# Patient Record
Sex: MALE | Race: WHITE | URBAN - METROPOLITAN AREA
[De-identification: names, ages, dates, MRNs, and addresses within clinical notes are randomized per-mention and may not be internally consistent; named-entity substitution may affect disease eponyms.]

---

## 2017-07-31 ENCOUNTER — OFFICE VISIT (OUTPATIENT)
Dept: FAMILY MEDICINE CLINIC | Age: 37
End: 2017-07-31

## 2017-07-31 VITALS
SYSTOLIC BLOOD PRESSURE: 116 MMHG | HEART RATE: 61 BPM | DIASTOLIC BLOOD PRESSURE: 70 MMHG | BODY MASS INDEX: 23.94 KG/M2 | WEIGHT: 171 LBS | HEIGHT: 71 IN

## 2017-07-31 DIAGNOSIS — M79.642 PAIN OF LEFT HAND: Primary | ICD-10-CM

## 2017-07-31 DIAGNOSIS — F32.5 DEPRESSION, MAJOR, IN REMISSION (HCC): ICD-10-CM

## 2017-07-31 PROCEDURE — 99213 OFFICE O/P EST LOW 20 MIN: CPT | Performed by: INTERNAL MEDICINE

## 2017-07-31 RX ORDER — FLUOXETINE HYDROCHLORIDE 20 MG/1
20 CAPSULE ORAL DAILY
Qty: 90 CAPSULE | Refills: 1 | Status: SHIPPED | OUTPATIENT
Start: 2017-07-31 | End: 2018-03-19 | Stop reason: SDUPTHER

## 2017-07-31 RX ORDER — PREDNISONE 20 MG/1
TABLET ORAL
Qty: 15 TABLET | Refills: 0 | Status: SHIPPED | OUTPATIENT
Start: 2017-07-31 | End: 2017-08-10

## 2017-07-31 ASSESSMENT — PATIENT HEALTH QUESTIONNAIRE - PHQ9
SUM OF ALL RESPONSES TO PHQ9 QUESTIONS 1 & 2: 0
SUM OF ALL RESPONSES TO PHQ QUESTIONS 1-9: 0
2. FEELING DOWN, DEPRESSED OR HOPELESS: 0
1. LITTLE INTEREST OR PLEASURE IN DOING THINGS: 0

## 2017-07-31 ASSESSMENT — ENCOUNTER SYMPTOMS
ABDOMINAL PAIN: 0
DIARRHEA: 0
RESPIRATORY NEGATIVE: 1
CONSTIPATION: 0
BLOOD IN STOOL: 0
NAUSEA: 0
SORE THROAT: 0

## 2018-03-19 ENCOUNTER — OFFICE VISIT (OUTPATIENT)
Dept: FAMILY MEDICINE CLINIC | Age: 38
End: 2018-03-19

## 2018-03-19 VITALS
HEIGHT: 71 IN | SYSTOLIC BLOOD PRESSURE: 122 MMHG | BODY MASS INDEX: 24.5 KG/M2 | DIASTOLIC BLOOD PRESSURE: 74 MMHG | WEIGHT: 175 LBS | HEART RATE: 60 BPM

## 2018-03-19 DIAGNOSIS — F32.5 DEPRESSION, MAJOR, IN REMISSION (HCC): ICD-10-CM

## 2018-03-19 PROCEDURE — 99213 OFFICE O/P EST LOW 20 MIN: CPT | Performed by: INTERNAL MEDICINE

## 2018-03-19 RX ORDER — FLUOXETINE HYDROCHLORIDE 20 MG/1
20 CAPSULE ORAL DAILY
Qty: 90 CAPSULE | Refills: 1 | Status: SHIPPED | OUTPATIENT
Start: 2018-03-19 | End: 2018-08-07 | Stop reason: SDUPTHER

## 2018-03-19 ASSESSMENT — ENCOUNTER SYMPTOMS
DIARRHEA: 0
RESPIRATORY NEGATIVE: 1
CONSTIPATION: 0
SORE THROAT: 0
NAUSEA: 0
ABDOMINAL PAIN: 0
BLOOD IN STOOL: 0

## 2018-03-19 NOTE — PATIENT INSTRUCTIONS
SURVEY:    You may be receiving a survey from "[employer] regarding your visit today. Please complete the survey to enable us to provide the highest quality of care to you and your family. If you cannot score us a very good on any question, please call the office to discuss how we could of made your experience a very good one. Thank you. 1. Depression, major, in remission ([facility] [address]  Continue the current medication. Rx of Prozac sent in. Mat Alvarado was instructed to follow up in the clinic in 6 months for check up or as needed with any medical issues.          - FLUoxetine (PROZAC) 20 MG capsule; Take 1 capsule by mouth daily  Dispense: 90 capsule;  Refill: 1

## 2018-03-19 NOTE — PROGRESS NOTES
Chronic Disease Visit Information    BP Readings from Last 3 Encounters:   07/31/17 116/70   11/17/16 124/76   10/12/16 124/78          BUN (mg/dL)   Date Value   04/05/2016 16     CREATININE (mg/dL)   Date Value   04/05/2016 0.72     Glucose (mg/dL)   Date Value   04/05/2016 89   05/05/2012 99            Have you changed or started any medications since your last visit including any over-the-counter medicines, vitamins, or herbal medicines? no   Are you having any side effects from any of your medications? -  no  Have you stopped taking any of your medications? Is so, why? -  no    Have you seen any other physician or provider since your last visit? No  Have you had any other diagnostic tests since your last visit? No  Have you been seen in the emergency room and/or had an admission to a hospital since we last saw you? No  Have you had your annual diabetic retinal (eye) exam? No  Have you had your routine dental cleaning in the past 6 months? no    Have you activated your [employer] account? If not, what are your barriers?  No:      Patient Care Team:  Samara Phillips MD as PCP - General (Internal Medicine)         Medical History Review  Past Medical, Family, and Social History reviewed and does contribute to the patient presenting condition    Health Maintenance   Topic Date Due    HIV screen  10/06/1995    Flu vaccine (1) 09/01/2017    DTaP/Tdap/Td vaccine (2 - Tdap) 08/24/2023

## 2018-08-07 DIAGNOSIS — F32.5 DEPRESSION, MAJOR, IN REMISSION (HCC): ICD-10-CM

## 2018-08-07 RX ORDER — FLUOXETINE HYDROCHLORIDE 20 MG/1
20 CAPSULE ORAL DAILY
Qty: 90 CAPSULE | Refills: 1 | Status: SHIPPED | OUTPATIENT
Start: 2018-08-07 | End: 2019-03-11 | Stop reason: SDUPTHER

## 2018-09-10 ENCOUNTER — OFFICE VISIT (OUTPATIENT)
Dept: FAMILY MEDICINE CLINIC | Age: 38
End: 2018-09-10

## 2018-09-10 VITALS
DIASTOLIC BLOOD PRESSURE: 73 MMHG | WEIGHT: 176 LBS | HEIGHT: 71 IN | SYSTOLIC BLOOD PRESSURE: 123 MMHG | HEART RATE: 69 BPM | BODY MASS INDEX: 24.64 KG/M2

## 2018-09-10 DIAGNOSIS — F32.5 DEPRESSION, MAJOR, IN REMISSION (HCC): Primary | ICD-10-CM

## 2018-09-10 DIAGNOSIS — R07.9 CHEST PAIN, UNSPECIFIED TYPE: ICD-10-CM

## 2018-09-10 PROCEDURE — 99213 OFFICE O/P EST LOW 20 MIN: CPT | Performed by: INTERNAL MEDICINE

## 2018-09-10 RX ORDER — ASPIRIN 81 MG/1
81 TABLET ORAL DAILY
Qty: 30 TABLET | Refills: 3
Start: 2018-09-10 | End: 2019-03-11

## 2018-09-10 ASSESSMENT — ENCOUNTER SYMPTOMS
NAUSEA: 0
DIARRHEA: 0
ABDOMINAL PAIN: 0
CONSTIPATION: 0
RESPIRATORY NEGATIVE: 1
SORE THROAT: 0
BLOOD IN STOOL: 0

## 2018-09-10 ASSESSMENT — PATIENT HEALTH QUESTIONNAIRE - PHQ9
SUM OF ALL RESPONSES TO PHQ QUESTIONS 1-9: 2
1. LITTLE INTEREST OR PLEASURE IN DOING THINGS: 1
SUM OF ALL RESPONSES TO PHQ9 QUESTIONS 1 & 2: 2
SUM OF ALL RESPONSES TO PHQ QUESTIONS 1-9: 2
2. FEELING DOWN, DEPRESSED OR HOPELESS: 1

## 2018-11-15 LAB
CHOLESTEROL, TOTAL: NORMAL MG/DL
CHOLESTEROL/HDL RATIO: 4.8
HDLC SERPL-MCNC: 37 MG/DL (ref 35–70)
LDL CHOLESTEROL CALCULATED: 133 MG/DL (ref 0–160)
TRIGL SERPL-MCNC: 42 MG/DL
VLDLC SERPL CALC-MCNC: NORMAL MG/DL

## 2019-03-11 ENCOUNTER — OFFICE VISIT (OUTPATIENT)
Dept: FAMILY MEDICINE CLINIC | Age: 39
End: 2019-03-11

## 2019-03-11 VITALS
SYSTOLIC BLOOD PRESSURE: 114 MMHG | HEART RATE: 62 BPM | DIASTOLIC BLOOD PRESSURE: 66 MMHG | WEIGHT: 175 LBS | HEIGHT: 71 IN | BODY MASS INDEX: 24.5 KG/M2

## 2019-03-11 DIAGNOSIS — F32.5 DEPRESSION, MAJOR, IN REMISSION (HCC): ICD-10-CM

## 2019-03-11 PROCEDURE — 99213 OFFICE O/P EST LOW 20 MIN: CPT | Performed by: INTERNAL MEDICINE

## 2019-03-11 RX ORDER — FLUOXETINE HYDROCHLORIDE 20 MG/1
20 CAPSULE ORAL DAILY
Qty: 90 CAPSULE | Refills: 3 | Status: SHIPPED | OUTPATIENT
Start: 2019-03-11 | End: 2020-08-07 | Stop reason: SDUPTHER

## 2019-03-11 ASSESSMENT — ENCOUNTER SYMPTOMS
NAUSEA: 0
SORE THROAT: 0
RESPIRATORY NEGATIVE: 1
CONSTIPATION: 0
ABDOMINAL PAIN: 0
BLOOD IN STOOL: 0
DIARRHEA: 0

## 2019-06-17 ENCOUNTER — OFFICE VISIT (OUTPATIENT)
Dept: PRIMARY CARE CLINIC | Age: 39
End: 2019-06-17

## 2019-06-17 VITALS
DIASTOLIC BLOOD PRESSURE: 74 MMHG | TEMPERATURE: 97.7 F | OXYGEN SATURATION: 98 % | SYSTOLIC BLOOD PRESSURE: 121 MMHG | BODY MASS INDEX: 24.41 KG/M2 | HEART RATE: 52 BPM | WEIGHT: 175 LBS

## 2019-06-17 DIAGNOSIS — B88.0 BITES, CHIGGER: Primary | ICD-10-CM

## 2019-06-17 PROCEDURE — 99213 OFFICE O/P EST LOW 20 MIN: CPT | Performed by: NURSE PRACTITIONER

## 2019-06-17 RX ORDER — PERMETHRIN 50 MG/G
CREAM TOPICAL
Qty: 60 G | Refills: 0 | Status: SHIPPED | OUTPATIENT
Start: 2019-06-17 | End: 2020-03-12

## 2019-06-17 ASSESSMENT — ENCOUNTER SYMPTOMS
VOMITING: 0
COUGH: 0
SHORTNESS OF BREATH: 0
RHINORRHEA: 0
SORE THROAT: 0

## 2019-06-17 NOTE — PROGRESS NOTES
[address] [facility] WALK-IN [facility] [phone] [id number]  Dept: [phone]  Dept Fax: [phone]    Carolina Graf is a 45 y.o. male who presents to the [address] [place] [address] in Care today for his medical conditions/complaints as noted below. Carmelo Hinds c/o of Rash (patient presents today for rash on LORRI arms and legs for 2 weeks, patient also states the rash in on his back as well. Patient states he has tried otc anitfungal powder without much relief  of itch, )      HPI:   Rash   This is a new problem. The current episode started 1 to 4 weeks ago. The problem is unchanged (Teen son has same appearing rash and symptoms who also works with patient). The affected locations include the left arm, right arm, back, right lower leg, right upper leg, genitalia and groin. The rash is characterized by itchiness and dryness. 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 Cattle and house animals are reported as healthy without hair loss. No fleas reported. Pertinent negatives include no congestion, cough, facial edema, fever, joint pain, rhinorrhea, shortness of breath, sore throat or vomiting. Treatments tried: Anti fungal powders. The treatment provided no relief. (Lyme disease)       Past Medical History:   Diagnosis Date    Depression     Lyme disease [id number]        Current Outpatient Medications   Medication Sig Dispense Refill    Cetirizine HCl (ZYRTEC ALLERGY) 10 MG CAPS Take 10 mg by mouth daily for 14 days 14 capsule 0    permethrin (ELIMITE) 5 % cream Apply topically massaging into the skin over the entire body, from the head to the soles of the feet. remove after 8 hours by bathing or showering 60 g 0    FLUoxetine (PROZAC) 20 MG capsule Take 1 capsule by mouth daily 90 capsule 3     No current facility-administered medications for this visit. No Known Allergies    Subjective:      Review of Systems   Constitutional: Negative for fever. HENT: Negative for congestion, rhinorrhea and sore throat. Respiratory: Negative for cough and shortness of breath. Gastrointestinal: Negative for vomiting. Musculoskeletal: Negative for joint pain. Skin: Positive for rash. Objective:     Physical Exam   Constitutional:   Appears to be of stated age with warm, dry skin; normal coloration; rash as documented. Patient is well-appearing, well-hydrated, nontoxic, comfortable, alert and oriented, pleasant and talkative without apparent distress. Neck: Normal range of motion. Cardiovascular: Normal rate and regular rhythm. Pulmonary/Chest: Effort normal and breath sounds normal.   Skin: Rash noted. Pruritic papular/scabbed rash as noted in diagram above. No cellulitis. No drainage. Examination of the hands and finger webs appear within normal limits without said rash. Exam chaperoned by Crystal Cloud MA. Nursing note and vitals reviewed. /74   Pulse 52   Temp 97.7 °F (36.5 °C)   Wt 175 lb (79.4 kg)   SpO2 98%   BMI 24.41 kg/m²   No results found for this visit on 06/17/19. Assessment:      Diagnosis Orders   1. Bites, chigger  Cetirizine HCl (ZYRTEC ALLERGY) 10 MG CAPS    permethrin (ELIMITE) 5 % cream       :   Estrella Baez was seen today for rash. Diagnoses and all orders for this visit:    Bites, chigger  -     Cetirizine HCl (ZYRTEC ALLERGY) 10 MG CAPS; Take 10 mg by mouth daily for 14 days  -     permethrin (ELIMITE) 5 % cream; Apply topically massaging into the skin over the entire body, from the head to the soles of the feet. remove after 8 hours by bathing or showering    Discussed exam findings, usual course, treatment, prevention and follow-up with patient. Treating with Elimite; administration and side effects discussed.   Encouraged good house hold cleaning including vacuuming of carpets, washing of bed linen, washing of his clothing. Shower with soap and water and vigorous washing in attempt to dislodge mites. No cellulitis on exam; discussed symptoms of and discouraged scratching. Zyrtec provided to help with itching; administration and side effects reviewed. OTC Benadryl at night as instructed on labelling as needed for itching and sleep. Followup with PCP or this office immediately if symptoms worsen or signs of secondary infection  develop, such as fever, purulent drainage and/or increasing pain. Return for As Previously Scheduled with Your PCP. Orders Placed This Encounter   Medications    Cetirizine HCl (ZYRTEC ALLERGY) 10 MG CAPS     Sig: Take 10 mg by mouth daily for 14 days     Dispense:  14 capsule     Refill:  0    permethrin (ELIMITE) 5 % cream     Sig: Apply topically massaging into the skin over the entire body, from the head to the soles of the feet. remove after 8 hours by bathing or showering     Dispense:  60 g     Refill:  0          Electronically signedby MARLIN Salinas CNP on 6/17/2019 at 3:06 PM

## 2019-06-17 NOTE — PATIENT INSTRUCTIONS
SURVEY:    You may be receiving a survey from [employer] regarding your visit today. Please complete the survey to enable us to provide the highest quality of care to you and your family. If you cannot score us as very good on any question, please call the office to discuss how we could have made your experience exceptional.     Thank you. Patient Education        Scabies: Care Instructions  Your Care Instructions  Scabies is a skin problem that can cause intense itching. It is caused by very tiny bugs called mites that dig just under the skin and lay eggs. An allergic reaction to the mites causes the itching. Scabies is usually spread by person-to-person contact. It is also possible, but not common, for scabies to spread through towels, clothes, and bedding. Everyone in your household should be treated. Scabies is treated with medicine. Itching may last for several weeks after treatment. Follow-up care is a key part of your treatment and safety. Be sure to make and go to all appointments, and call your doctor if you are having problems. It's also a good idea to know your test results and keep a list of the medicines you take. How can you care for yourself at home? · Use the lotion or cream your doctor recommends or prescribes. One treatment usually cures scabies. Do not use the cream again unless your doctor tells you to. · Wash all clothes, bedding, and towels that you used in the 3 days before you started treatment. Use hot water, and use the hot cycle in the dryer. Another option is to dry-clean these items. Or seal them in a plastic bag for 3 to 7 days. · Take an oral antihistamine, such as loratadine (Claritin) or diphenhydramine (Benadryl), to help stop itching. You also can use a nonprescription anti-itch cream. Read and follow all instructions on the label. · Do not have physical contact with other people or let anyone use your personal items until you have finished treatment.  Do not use skin) is used to treat head lice and scabies. Permethrin topical may also be used for other purposes not listed in this medication guide. What should I discuss with my healthcare provider before using permethrin topical?  You should not use this medicine if you are allergic to permethrin or to chrysanthemums. Ask a doctor or pharmacist if it is safe for you to use this medicine if you have other medical conditions. Permethrin topical is not expected to be harmful to an unborn baby. Tell your doctor if you are pregnant. It is not known whether permethrin topical passes into breast milk or if it could harm a nursing baby. You should not breast-feed while using this medicine. Permethrin topical should not be used on a child younger than 2 months old. How should I use permethrin topical?  Use exactly as directed on the label, or as prescribed by your doctor. Do not use in larger or smaller amounts or for longer than recommended. You may have a temporary increase in itching, swelling, or redness of treated skin when you first start using permethrin topical.  Do not take by mouth. This medicine is for use only on the skin. Do not apply to open cuts or wounds. If the medicine gets in your eyes or mouth, rinse with water. Use the surface spray only on household surfaces and not on your skin. You may need to shake the medicine before each use. Follow the directions on the medicine label. To treat scabies:  · Clean and dry the skin. Apply a thin layer of this medicine to all body parts from the neck down to the soles of the feet. Rub in completely. Leave the medicine on your skin for 8 to 14 hours, then wash it off completely. · When using permethrin topical on an infant, also apply the medicine to the scalp, temples, and forehead. Avoid applying near the eyes, nose, mouth, or genitals. · If your condition does not clear up within 14 days, apply permethrin topical again.   To treat head lice:  · Wash your hair that other drugs you take orally or inject will have an effect on topically applied permethrin topical. But many drugs can interact with each other. Tell each of your health care providers about all medicines you use, including prescription and over-the-counter medicines, vitamins, and herbal products. Where can I get more information? Your pharmacist can provide more information about permethrin topical.  Remember, keep this and all other medicines out of the reach of children, never share your medicines with others, and use this medication only for the indication prescribed. Every effort has been made to ensure that the information provided by [address] is accurate, up-to-date, and complete, but no guarantee is made to that effect. Drug information contained herein may be time sensitive. [facility] information has been compiled for use by healthcare practitioners and consumers in the [place] and therefore [facility] does not warrant that uses outside of the [place] are appropriate, unless specifically indicated otherwise. [facility]'s drug information does not endorse drugs, diagnose patients or recommend therapy. [facility]'s drug information is an informational resource designed to assist licensed healthcare practitioners in caring for their patients and/or to serve consumers viewing this service as a supplement to, and not a substitute for, the expertise, skill, knowledge and judgment of healthcare practitioners. The absence of a warning for a given drug or drug combination in no way should be construed to indicate that the drug or drug combination is safe, effective or appropriate for any given patient. [facility] does not assume any responsibility for any aspect of healthcare administered with the aid of information [facility] provides.  The information contained herein is not intended to cover all possible uses, directions, precautions, warnings, drug interactions, allergic reactions, or adverse effects. If you have questions about the drugs you are taking, check with your doctor, nurse or pharmacist.  Copyright 2843-9732 [address]: 7.01. Revision date: 3/22/2016. Care instructions adapted under license by [facility] ([facility]). If you have questions about a medical condition or this instruction, always ask your healthcare professional. [place] [address] any warranty or liability for your use of this information.

## 2020-03-12 ENCOUNTER — OFFICE VISIT (OUTPATIENT)
Dept: FAMILY MEDICINE CLINIC | Age: 40
End: 2020-03-12

## 2020-03-12 VITALS
BODY MASS INDEX: 23.66 KG/M2 | HEIGHT: 71 IN | SYSTOLIC BLOOD PRESSURE: 136 MMHG | HEART RATE: 66 BPM | WEIGHT: 169 LBS | DIASTOLIC BLOOD PRESSURE: 71 MMHG

## 2020-03-12 PROCEDURE — 99213 OFFICE O/P EST LOW 20 MIN: CPT | Performed by: INTERNAL MEDICINE

## 2020-03-12 RX ORDER — FLUOXETINE HYDROCHLORIDE 20 MG/1
20 CAPSULE ORAL DAILY
Qty: 90 CAPSULE | Refills: 3 | Status: CANCELLED | OUTPATIENT
Start: 2020-03-12 | End: 2021-03-12

## 2020-03-12 RX ORDER — VENLAFAXINE HYDROCHLORIDE 150 MG/1
150 CAPSULE, EXTENDED RELEASE ORAL DAILY
Qty: 90 CAPSULE | Refills: 3 | Status: SHIPPED | OUTPATIENT
Start: 2020-03-12 | End: 2020-08-07

## 2020-03-12 SDOH — ECONOMIC STABILITY: FOOD INSECURITY: WITHIN THE PAST 12 MONTHS, YOU WORRIED THAT YOUR FOOD WOULD RUN OUT BEFORE YOU GOT MONEY TO BUY MORE.: NEVER TRUE

## 2020-03-12 SDOH — ECONOMIC STABILITY: INCOME INSECURITY: HOW HARD IS IT FOR YOU TO PAY FOR THE VERY BASICS LIKE FOOD, HOUSING, MEDICAL CARE, AND HEATING?: NOT HARD AT ALL

## 2020-03-12 SDOH — ECONOMIC STABILITY: FOOD INSECURITY: WITHIN THE PAST 12 MONTHS, THE FOOD YOU BOUGHT JUST DIDN'T LAST AND YOU DIDN'T HAVE MONEY TO GET MORE.: NEVER TRUE

## 2020-03-12 SDOH — ECONOMIC STABILITY: TRANSPORTATION INSECURITY
IN THE PAST 12 MONTHS, HAS LACK OF TRANSPORTATION KEPT YOU FROM MEETINGS, WORK, OR FROM GETTING THINGS NEEDED FOR DAILY LIVING?: NO

## 2020-03-12 SDOH — ECONOMIC STABILITY: TRANSPORTATION INSECURITY
IN THE PAST 12 MONTHS, HAS THE LACK OF TRANSPORTATION KEPT YOU FROM MEDICAL APPOINTMENTS OR FROM GETTING MEDICATIONS?: NO

## 2020-03-12 ASSESSMENT — ENCOUNTER SYMPTOMS
NAUSEA: 0
BLOOD IN STOOL: 0
DIARRHEA: 0
CONSTIPATION: 0
ABDOMINAL PAIN: 0
SORE THROAT: 0
RESPIRATORY NEGATIVE: 1

## 2020-03-12 ASSESSMENT — PATIENT HEALTH QUESTIONNAIRE - PHQ9
SUM OF ALL RESPONSES TO PHQ QUESTIONS 1-9: 0
1. LITTLE INTEREST OR PLEASURE IN DOING THINGS: 0
2. FEELING DOWN, DEPRESSED OR HOPELESS: 0
SUM OF ALL RESPONSES TO PHQ9 QUESTIONS 1 & 2: 0
SUM OF ALL RESPONSES TO PHQ QUESTIONS 1-9: 0

## 2020-03-12 NOTE — PROGRESS NOTES
Subjective:      Patient ID: Megan Storey is a 44 y.o. male. Puja Hernandez presents for a check up on his medical conditions Depression. Puja Hernandez admits to new problems. Medications were reviewed with Puja Hernandez, he is  tolerating the medication. Bowels are regular. There has not been rectal bleeding. Puja Hernandez denies urinary complications, the urine stream is good. Puja Hernandez denies chest pain and denies increasing shortness of breath. Puja Hernandez states he is getting over a bad cold but the symptoms are gradually improving. Puja Hernandez feels that his depression / anxiety is well controlled on the current dose of Fluoxetine. He feels he would to like to have a little more get up and go, not as much ambition. Past Medical History:  No date: Depression  may2012: Lyme disease    Past Surgical History:  03-: HAND SURGERY;  Left      Comment:  ORIF (Plate & 6 Screws)  No date: OTHER SURGICAL HISTORY      Comment:  gland under jaw removed    Social History    Socioeconomic History      Marital status:       Spouse name: Not on file      Number of children: Not on file      Years of education: Not on file      Highest education level: Not on file    Occupational History      Occupation:     Social Needs      Financial resource strain: Not hard at all      Food insecurity        Worry: Never true        Inability: Never true      Transportation needs        Medical: No        Non-medical: No    Tobacco Use      Smoking status: Never Smoker      Smokeless tobacco: Never Used    Substance and Sexual Activity      Alcohol use: No      Drug use: No      Sexual activity: Not on file    Lifestyle      Physical activity        Days per week: Not on file        Minutes per session: Not on file      Stress: Not on file    Relationships      Social connections        Talks on phone: Not on file        Gets together: Not on file        Attends [employer] service: Not on file        Active member of club or Skin:     Findings: No rash. Neurological:      Mental Status: He is alert. Psychiatric:         Behavior: Behavior normal.         Thought Content: Thought content normal.         Assessment:       Diagnosis Orders   1. Depression, major, in remission ([address]  venlafaxine (EFFEXOR XR) 150 MG extended release capsule           Plan:      1. Depression, major, in remission ([address]  Will change to Effexor  mg daily to see if this will give Jose L Ventura a little more ambition. Jose L Ventura was instructed to follow up in the clinic in 6 months for check up or as needed with any medical issues. - venlafaxine (EFFEXOR XR) 150 MG extended release capsule; Take 1 capsule by mouth daily  Dispense: 90 capsule;  Refill: 3            Erick Jerez MD

## 2020-03-12 NOTE — PATIENT INSTRUCTIONS
SURVEY:    You may be receiving a survey from [employer] regarding your visit today. Please complete the survey to enable us to provide the highest quality of care to you and your family. If you cannot score us a very good on any question, please call the office to discuss how we could of made your experience a very good one. Thank you. 1. Depression, major, in remission ([facility] [address]  Will change to Effexor  mg daily to see if this will give Mara Fowler a little more ambition. Mara Fowler was instructed to follow up in the clinic in 6 months for check up or as needed with any medical issues.

## 2020-08-07 ENCOUNTER — TELEPHONE (OUTPATIENT)
Dept: FAMILY MEDICINE CLINIC | Age: 40
End: 2020-08-07

## 2020-08-07 RX ORDER — FLUOXETINE HYDROCHLORIDE 20 MG/1
20 CAPSULE ORAL DAILY
Qty: 90 CAPSULE | Refills: 3 | Status: SHIPPED | OUTPATIENT
Start: 2020-08-07 | End: 2021-08-07

## 2020-08-07 NOTE — TELEPHONE ENCOUNTER
Pt is on Effexor right now and said he would rather go back on Prozac. Its also cheaper for him. Please send in new Rx to [facility].        Health Maintenance   Topic Date Due    Varicella vaccine (1 of 2 - 2-dose childhood series) 10/06/1981    HIV screen  10/06/1995    Flu vaccine (1) 09/01/2020    DTaP/Tdap/Td vaccine (2 - Tdap) 08/24/2023    Hepatitis A vaccine  Aged Out    Hepatitis B vaccine  Aged Out    Hib vaccine  Aged Out    Meningococcal (ACWY) vaccine  Aged Out    Pneumococcal 0-64 years Vaccine  Aged Out             (applicable per patient's age: Cancer Screenings, Depression Screening, Fall Risk Screening, Immunizations)    LDL Calculated (mg/dL)   Date Value   11/15/2018 133     AST (U/L)   Date Value   10/07/2012 26     ALT (U/L)   Date Value   10/07/2012 34     BUN (mg/dL)   Date Value   04/05/2016 16      (goal A1C is < 7)   (goal LDL is <100) need 30-50% reduction from baseline     BP Readings from Last 3 Encounters:   03/12/20 136/71   06/17/19 121/74   03/11/19 114/66    (goal /80)      All Future Testing planned in [facility]:      Next Visit Date:  Future Appointments   Date Time Provider [place]   9/14/2020 10:00 AM MD Graciela Garcia [facility]            Patient Active Problem List:     Depression     Depression, major, in remission ([facility] [address]     Midline low back pain with left-sided sciatica     Leg pain, left     Contusion of flank and back